# Patient Record
Sex: MALE
[De-identification: names, ages, dates, MRNs, and addresses within clinical notes are randomized per-mention and may not be internally consistent; named-entity substitution may affect disease eponyms.]

---

## 2017-08-07 NOTE — CT
PROCEDURE:  CT Abdomen and Pelvis with oral and  IV contrast.



HISTORY:

abd pain



COMPARISON:

None available



TECHNIQUE:

Contiguous axial images of the abdomen and pelvis. Oral and IV 

contrast was administered. Coronal and Sagittal reformats generated 

and reviewed. 



Contrast dose: 100 cc Visipaque 320



Radiation dose:



Total exam DLP = 771.87 mGy-cm.



This CT exam was performed using one or more of the following dose 

reduction techniques: Automated exposure control, adjustment of the 

mA and/or kV according to patient size, and/or use of iterative 

reconstruction technique.



FINDINGS:





LOWER THORAX:

No visible consolidation, pleural effusion, or pneumothorax. 



Small hiatal hernia/distal esophageal wall thickening. 



LIVER:

7 mm too small to characterize hepatic hypodensity (series 3, image 

55).Hypoattenuation of the liver compatible with hepatic steatosis. 



GALLBLADDER AND BILE DUCTS:

Cholecystectomy clips. 



PANCREAS:

Unremarkable. 



SPLEEN:

Unremarkable. 



ADRENALS:

Unremarkable. 



KIDNEYS AND URETERS:

The kidneys enhance symmetrically. No hydronephrosis or obstructing 

renal calculus. 



BLADDER:

Mildly thick-walled urinary bladder presumably exaggerated by under 

distension.



REPRODUCTIVE:

Unremarkable. 



APPENDIX:

The appendix appears within normal limits of caliber. No secondary 

signs of acute appendicitis.



BOWEL:

The stomach is nondistended. 



The bowel loops appear within normal limits of caliber without 

evidence of intestinal obstruction.Most of the left colon is 

decompressed, limiting evaluation for focal or diffuse pathology. 



PERITONEUM:

No significant free fluid. No definite free air.



LYMPH NODES:

No bulky lymphadenopathy identified.



VASCULATURE:

No aortic aneurysm. 



BONES:

Mild degenerative changes.



OTHER FINDINGS:

None. 



IMPRESSION:

Hepatic steatosis.  Too small to characterize 7 mm hepatic 

hypodensity ; statistically likely a cyst or hemangioma.



Cholecystectomy.



Mildly thick-walled urinary bladder, presumably exaggerated by under 

distension.  Recommend correlation with urinalysis.



Small hiatal hernia/ distal esophageal wall thickening.



Additional findings as above.

## 2017-08-07 NOTE — RAD
PROCEDURE:  CHEST RADIOGRAPH, 1 VIEW



HISTORY:

Abdominal pain 



COMPARISON:

None available.



FINDINGS:



LUNGS:

Mild venous congestion.  Right hilar prominence.  Patchy increased 

markings at the left lung base in a somewhat rounded configuration.  

Diffuse increased interstitial lung markings.



PLEURA:

No pneumothorax or pleural fluid seen.



CARDIOVASCULAR:

Normal.



OSSEOUS STRUCTURES:

No significant abnormalities.



VISUALIZED UPPER ABDOMEN:

Normal.



OTHER FINDINGS:

None. 



IMPRESSION:

Mild venous congestion.  Right hilar prominence.  Patchy increased 

markings at the left lung base in a somewhat rounded configuration.  

Diffuse increased interstitial lung markings.

## 2017-08-07 NOTE — C.PDOC
History Of Present Illness


41 yr old male presents to the ER with complaints of left sided abdominal pain 

for the past 2 weeks. Patient states 2 weeks ago he was seen at [facility] and was 

discharged with Pepcid but symptoms persisted. Patient also reports he has has 

diarrhea every time he eats and this morning he saw some blood. Patient denies 

fever, nausea, vomiting, dysuria, hematuria, weakness or numbness. 


Time Seen by Provider: 08/07/17 07:35


Chief Complaint (Nursing): Abdominal Pain


History Per: Patient


History/Exam Limitations: no limitations


Onset/Duration Of Symptoms: Days (2 weeks)





Past Medical History


Reviewed: Historical Data, Nursing Documentation, Vital Signs


Vital Signs: 


 Last Vital Signs











Temp  98.1 F   08/07/17 07:12


 


Pulse  75   08/07/17 10:57


 


Resp  17   08/07/17 10:57


 


BP  102/65   08/07/17 10:57


 


Pulse Ox  98   08/07/17 11:31











Surgical History: Cholecystectomy


Family History: States: No Known Family Hx





- Social History


Hx Alcohol Use: Yes


Hx Substance Use: No





Review Of Systems


Except As Marked, All Systems Reviewed And Found Negative.


Constitutional: Negative for: Fever


Gastrointestinal: Positive for: Abdominal Pain (Left sided), Diarrhea, 

Hematochezia.  Negative for: Nausea, Vomiting


Genitourinary: Negative for: Dysuria, Hematuria


Neurological: Negative for: Weakness, Numbness





Physical Exam





- Physical Exam


Appears: Non-toxic, In Acute Distress (Mild distress, holding on his left side. 

)


Skin: Warm, Dry, No Rash


Head: Atraumatic, Normacephalic


Oral Mucosa: Moist


Gastrointestinal/Abdominal: Soft, Tenderness (LLQ tenderness. ), No Guarding, 

No Rebound


Rectal: Normal Exam, No Blood Streaked Stool, No Mass, No Tenderness


Extremity: Normal ROM, No Swelling


Neurological/Psych: Oriented x3, Normal Speech, Normal Motor





ED Course And Treatment





- Laboratory Results


Result Diagrams: 


 08/07/17 07:50





 08/07/17 07:50


O2 Sat by Pulse Oximetry: 98 (RA )


Pulse Ox Interpretation: Normal





- Other Rad


  ** CXR


X-Ray: Viewed By Me, Read By Radiologist


Interpretation: PROCEDURE:  CHEST RADIOGRAPH, 1 VIEW.  HISTORY:  Abdominal 

pain.  COMPARISON:  None available.  FINDINGS:  LUNGS:  Mild venous congestion.

  Right hilar prominence.  Patchy increased markings at the left lung base in a 

somewhat rounded configuration.  Diffuse increased interstitial lung markings.  

PLEURA:  No pneumothorax or pleural fluid seen.  CARDIOVASCULAR:  Normal.  

OSSEOUS STRUCTURES:  No significant abnormalities.  VISUALIZED UPPER ABDOMEN:  

Normal.  OTHER FINDINGS:  None.  IMPRESSION:  Mild venous congestion.  Right 

hilar prominence.  Patchy increased markings at the left lung base in a 

somewhat rounded configuration.  Diffuse increased interstitial lung markings.





- CT Scan/US


  ** CT - Abd & Pelvis 


Other Rad Studies (CT/US): Read By Radiologist, Radiology Report Reviewed


CT/US Interpretation: PROCEDURE:  CT Abdomen and Pelvis with oral and  IV 

contrast.  HISTORY:  abd pain.  COMPARISON:  None available.  TECHNIQUE:  

Contiguous axial images of the abdomen and pelvis. Oral and IV contrast was 

administered. Coronal and Sagittal reformats generated and reviewed.  Contrast 

dose: 100 cc Visipaque 320.  Radiation dose:  Total exam DLP = 771.87 mGy-cm.  

This CT exam was performed using one or more of the following dose reduction 

techniques: Automated exposure control, adjustment of the mA and/or kV 

according to patient size, and/or use of iterative reconstruction technique.  

FINDINGS:  LOWER THORAX:  No visible consolidation, pleural effusion, or 

pneumothorax.  Small hiatal hernia/distal esophageal wall thickening.  LIVER:  

7 mm too small to characterize hepatic hypodensity (series 3, image 55)

.Hypoattenuation of the liver compatible with hepatic steatosis.  GALLBLADDER 

AND BILE DUCTS:  Cholecystectomy clips.  PANCREAS:  Unremarkable.  SPLEEN:  

Unremarkable.  ADRENALS:  Unremarkable.  KIDNEYS AND URETERS:  The kidneys 

enhance symmetrically. No hydronephrosis or obstructing renal calculus.  BLADDER

:  Mildly thick-walled urinary bladder presumably exaggerated by under 

distension.  REPRODUCTIVE:  Unremarkable.  APPENDIX:  The appendix appears 

within normal limits of caliber. No secondary signs of acute appendicitis.  

BOWEL:  The stomach is nondistended.  The bowel loops appear within normal 

limits of caliber without evidence of intestinal obstruction.Most of the left 

colon is decompressed, limiting evaluation for focal or diffuse pathology.  

PERITONEUM:  No significant free fluid. No definite free air.  LYMPH NODES:  No 

bulky lymphadenopathy identified.  VASCULATURE:  No aortic aneurysm.  BONES:  

Mild degenerative changes.  OTHER FINDINGS:  None.  IMPRESSION:  Hepatic 

steatosis.  Too small to characterize 7 mm hepatic hypodensity ; statistically 

likely a cyst or hemangioma.  Cholecystectomy.  Mildly thick-walled urinary 

bladder, presumably exaggerated by under distension.  Recommend correlation 

with urinalysis.  Small hiatal hernia/ distal esophageal wall thickening.  

Additional findings as above.





Medical Decision Making


Medical Decision Making: 


PLAN:


* CT - Abd & Pelvis 


* CXR


* EKG 


* CBC


* CMP


* Urinalysis 








Disposition


Counseled Patient/Family Regarding: Studies Performed, Need For Followup, Rx 

Given





- Disposition


Referrals: 


[facility] [Outside]


Disposition: HOME/ ROUTINE


Disposition Time: 11:44


Condition: STABLE


Prescriptions: 


Ciprofloxacin [Cipro] 1 tab PO BID #14 tab


Instructions:  Abdominal Pain (ED)


Forms:  [facility] (English), Work Excuse





- Clinical Impression


Clinical Impression: 


 Abdominal pain, Abdominal colic








- Scribe Statement


The provider has reviewed the documentation as recorded by the Delorisibe


Bailey Tang


Provider Attestation: 


All medical record entries made by the Delorisibdiego were at my direction and 

personally dictated by me. I have reviewed the chart and agree that the record 

accurately reflects my personal performance of the history, physical exam, 

medical decision making, and the department course for this patient. I have 

also personally directed, reviewed, and agree with the discharge instructions 

and disposition.

## 2017-08-07 NOTE — C.PDOC
Time Seen by Provider: 08/07/17 07:35


Chief Complaint (Nursing): Abdominal Pain





Past Medical History


Vital Signs: 





 Last Vital Signs











Temp  98.1 F   08/07/17 07:12


 


Pulse  90   08/07/17 07:12


 


Resp  18   08/07/17 07:12


 


BP  116/73   08/07/17 07:12


 


Pulse Ox  98   08/07/17 07:12











Surgical History: Cholecystectomy





- Social History


Hx Alcohol Use: Yes


Hx Substance Use: No





ED Course And Treatment


O2 Sat by Pulse Oximetry: 98





Disposition





- Disposition


Forms:  [facility] (English)

## 2017-08-08 NOTE — CARD
--------------- APPROVED REPORT --------------





EKG Measurement

Heart [id number]

AK [id number]

[id number]

[id number]

[id number]



<Conclusion>

Normal sinus rhythm

Normal ECG

## 2017-10-30 ENCOUNTER — HOSPITAL ENCOUNTER (EMERGENCY)
Dept: HOSPITAL 42 - ED | Age: 42
LOS: 1 days | Discharge: HOME | End: 2017-10-31
Payer: MEDICAID

## 2017-10-30 VITALS
HEART RATE: 73 BPM | RESPIRATION RATE: 17 BRPM | TEMPERATURE: 98.5 F | SYSTOLIC BLOOD PRESSURE: 135 MMHG | DIASTOLIC BLOOD PRESSURE: 108 MMHG | OXYGEN SATURATION: 99 %

## 2017-10-30 VITALS — BODY MASS INDEX: 28.5 KG/M2

## 2017-10-30 DIAGNOSIS — Z87.442: ICD-10-CM

## 2017-10-30 DIAGNOSIS — F17.210: ICD-10-CM

## 2017-10-30 DIAGNOSIS — J45.909: Primary | ICD-10-CM

## 2017-10-31 RX ADMIN — IPRATROPIUM BROMIDE AND ALBUTEROL SULFATE SCH ML: .5; 3 SOLUTION RESPIRATORY (INHALATION) at 00:38

## 2017-10-31 RX ADMIN — IPRATROPIUM BROMIDE AND ALBUTEROL SULFATE SCH ML: .5; 3 SOLUTION RESPIRATORY (INHALATION) at 00:54

## 2017-10-31 RX ADMIN — IPRATROPIUM BROMIDE AND ALBUTEROL SULFATE SCH ML: .5; 3 SOLUTION RESPIRATORY (INHALATION) at 01:05

## 2017-10-31 NOTE — ED PDOC
Arrival/HPI





- General


Chief Complaint: Cough, Cold, Congestion


Time Seen by Provider: 10/30/17 23:57


Historian: Patient





- History of Present Illness


Narrative History of Present Illness (Text): 


10/31/17 00:02


Gulshan Miller is a 42 year old male smoker, whose past medical history 

includes kidney stones and acute kidney injury, who presents to the Emergency 

department complaining of cough. Patient states he has been experiencing a 

persistent cough for the past 2 weeks with associated chest congestion/

discomfort. Patient also reports intermittent chills. Patient denies any 

shortness of breath, nausea, vomiting, diarrhea, urinary symptoms, neck pain, 

headache, dizziness, or any other complaints.


Time/Duration: < month (2 weeks)


Symptom Onset: Gradual


Symptom Course: Unchanged


Activities at Onset: Light


Context: Home





Past Medical History





- Provider Review


Nursing Documentation Reviewed: Yes





- Infectious Disease


Hx of Infectious Diseases: None





- Cardiac


Hx Cardiac Disorders: No





- Pulmonary


Hx Respiratory Disorders: No





- Neurological


Hx Neurological Disorder: No





- HEENT


Hx HEENT Disorder: No





- Renal


Hx Renal Disorder: Yes


Hx Kidney Stones: Yes


Hx Renal Failure: Yes





- Endocrine/Metabolic


Hx Endocrine Disorders: No





- Hematological/Oncological


Hx Blood Disorders: No





- Integumentary


Hx Basal Cell Carcinoma: No





- Musculoskeletal/Rheumatological


Hx Musculoskeletal Disorders: No


Hx Falls: No





- Gastrointestinal


Hx Gastrointestinal Disorders: No





- Genitourinary/Gynecological


Hx Genitourinary Disorders: Yes


Other/Comment: Kidney stones





- Psychiatric


Hx Psychophysiologic Disorder: No


Hx Substance Use: No





- Surgical History


Hx Cholecystectomy: Yes





- Anesthesia


Hx Anesthesia: Yes





Family/Social History





- Physician Review


Nursing Documentation Reviewed: Yes


Family/Social History: Unknown Family HX


Smoking Status: Current Some Days Smoker


Hx Alcohol Use: Yes


Hx Substance Use: No





Allergies/Home Meds


Allergies/Adverse Reactions: 


Allergies





seafood Allergy (Uncoded 10/30/17 23:57)


 ANAPHYLAXIS











Review of Systems





- Physician Review


All systems were reviewed & negative as marked: Yes





- Review of Systems


Constitutional: Normal.  absent: Fevers


Eyes: Normal


ENT: Normal


Respiratory: Cough


Cardiovascular: Other (+chest congestion)


Gastrointestinal: Normal.  absent: Abdominal Pain, Diarrhea, Nausea, Vomiting


Genitourinary Male: Normal


Musculoskeletal: Normal


Skin: Normal


Neurological: Normal


Endocrine: Normal


Hemo/Lymphatic: Normal


Psychiatric: Normal





Physical Exam


Vital Signs Reviewed: Yes


Vital Signs











  Temp Pulse Resp BP Pulse Ox


 


 10/30/17 23:22  98.5 F  73  17  135/108 H  99











Temperature: Afebrile


Blood Pressure: Normal


Pulse: Regular


Respiratory Rate: Normal


Appearance: Positive for: Well-Appearing, Non-Toxic, Comfortable


Pain Distress: None


Mental Status: Positive for: Alert and Oriented X 3





- Systems Exam


Head: Present: Atraumatic, Normocephalic


Pupils: Present: PERRL


Extroacular Muscles: Present: EOMI


Conjunctiva: Present: Normal


Mouth: Present: Moist Mucous Membranes


Neck: Present: Normal Range of Motion


Respiratory/Chest: Present: Wheezes.  No: Respiratory Distress, Accessory 

Muscle Use


Cardiovascular: Present: Regular Rate and Rhythm, Normal S1, S2.  No: Murmurs


Abdomen: Present: Normal Bowel Sounds.  No: Tenderness, Distention, Peritoneal 

Signs


Back: Present: Normal Inspection


Upper Extremity: Present: Normal Inspection.  No: Cyanosis, Edema


Lower Extremity: Present: Normal Inspection.  No: Edema


Neurological: Present: GCS=15, CN II-XII Intact, Speech Normal


Skin: Present: Warm, Dry, Normal Color.  No: Rashes


Psychiatric: Present: Alert, Oriented x 3, Normal Insight, Normal Concentration





Medical Decision Making


ED Course and Treatment: 


10/31/17 00:02


Impression:


42 year old male complaining of cough and chest congestion.





Differential Diagnosis included but are not limited to:  asthmatic bronchitis 





Plan:


-- Chest X-ray


-- Duoneb


-- Reassess and disposition





Prior Visits:


Notes and results from previous visits were reviewed.


On 12/23/2016, pt was seen in the Emergency department for right flank pain. Pt 

was discharged home.





Progress Notes:


10/31/17 01:18


Reviewed radiology, Chest X-ray shows no acute processes.





10/31/17 02:09


On reevaluation the patient feels better and is in no acute distress. I have 

discussed the results and plan with the patient, who expresses understanding. 

Patient given the opportunity to ask question, all questions were answered and 

there is agreement with the plan to discharge the patient home.  Patient is 

stable for discharge. Patient was instructed to follow up with physician/clinic 

in 1-2 days or return if symptoms persist/worsen or new concerning symptoms 

arise.





- RAD Interpretation


Radiology Orders: 








10/31/17 00:05


CHEST TWO VIEWS (PA/LAT) [RAD] Stat 











: ED Physician





- Medication Orders


Current Medication Orders: 











Discontinued Medications





Albuterol/Ipratropium (Duoneb 3 Mg/0.5 Mg (3 Ml) Ud)  3 ml IH Q15M LILY


   Stop: 10/31/17 00:46


   Last Admin: 10/31/17 01:05  Dose: 3 ml





Azithromycin (Zithromax)  500 mg PO ONCE STA


   PRN Reason: Protocol


   Stop: 10/31/17 02:02


   Last Admin: 10/31/17 02:21  Dose: 500 mg





Prednisone (Prednisone Tab)  60 mg PO ONCE STA


   Stop: 10/31/17 02:02


   Last Admin: 10/31/17 02:21  Dose: 60 mg





Promethazine HCl/Codeine (Phenergan/Codeine Oral Syrup)  5 ml PO ONCE STA


   Stop: 10/31/17 02:08


   Last Admin: 10/31/17 02:21  Dose: 5 ml











- Scribe Statement


The provider has reviewed the documentation as recorded by the Ml Fish





Provider Scribe Attestation:


All medical record entries made by the Massielibdaquan were at my direction and 

personally dictated by me. I have reviewed the chart and agree that the record 

accurately reflects my personal performance of the history, physical exam, 

medical decision making, and the department course for this patient. I have 

also personally directed, reviewed, and agree with the discharge instructions 

and disposition.








Disposition/Present on Arrival





- Present on Arrival


Any Indicators Present on Arrival: No


History of DVT/PE: No


History of Uncontrolled Diabetes: No


Urinary Catheter: No


History of Decub. Ulcer: No


History Surgical Site Infection Following: None





- Disposition


Have Diagnosis and Disposition been Completed?: Yes


Diagnosis: 


 Asthmatic bronchitis





Disposition: HOME/ ROUTINE


Disposition Time: 02:10


Condition: GOOD


Discharge Instructions (ExitCare):  Acute Bronchitis (ED)


Prescriptions: 


Promethazine/Codeine [Codeine/Promethazine 10 MG/5 Ml-6.25 MG/5 Ml] 5 ml PO QID 

#100 ml


predniSONE [predniSONE Tab] 20 mg PO TID #15 tab


Albuterol HFA [Ventolin HFA] 1 puff IH QID #1 puff


Azithromycin [Zithromax] 250 mg PO DAILY #4 tab


Forms:  [facility] (English)

## 2017-10-31 NOTE — RAD
HISTORY:

fall  



COMPARISON:

No prior.



TECHNIQUE:

Chest PA and lateral



FINDINGS:



LUNGS:

No active pulmonary disease.



PLEURA:

No significant pleural effusion identified. No pneumothorax apparent.



CARDIOVASCULAR:

Normal.



OSSEOUS STRUCTURES:

No significant abnormalities.



VISUALIZED UPPER ABDOMEN:

Normal.



OTHER FINDINGS:

None.



IMPRESSION:

No acute cardiopulmonary disease appreciated.

## 2018-01-22 ENCOUNTER — HOSPITAL ENCOUNTER (EMERGENCY)
Dept: HOSPITAL 42 - ED | Age: 43
Discharge: HOME | End: 2018-01-22
Payer: MEDICAID

## 2018-01-22 VITALS — RESPIRATION RATE: 17 BRPM

## 2018-01-22 VITALS — OXYGEN SATURATION: 98 %

## 2018-01-22 VITALS — TEMPERATURE: 98.1 F | HEART RATE: 82 BPM | DIASTOLIC BLOOD PRESSURE: 62 MMHG | SYSTOLIC BLOOD PRESSURE: 110 MMHG

## 2018-01-22 VITALS — BODY MASS INDEX: 28.5 KG/M2

## 2018-01-22 DIAGNOSIS — F41.9: Primary | ICD-10-CM

## 2018-01-22 DIAGNOSIS — F17.210: ICD-10-CM

## 2018-01-22 DIAGNOSIS — R07.89: ICD-10-CM

## 2018-01-22 LAB
ALBUMIN SERPL-MCNC: 4.6 G/DL (ref 3–4.8)
ALBUMIN/GLOB SERPL: 1.3 {RATIO} (ref 1.1–1.8)
ALT SERPL-CCNC: 26 U/L (ref 7–56)
AST SERPL-CCNC: 26 U/L (ref 17–59)
BASOPHILS # BLD AUTO: 0.01 K/MM3 (ref 0–2)
BASOPHILS NFR BLD: 0.1 % (ref 0–3)
BUN SERPL-MCNC: 12 MG/DL (ref 7–21)
CALCIUM SERPL-MCNC: 10.1 MG/DL (ref 8.4–10.5)
D DIMER PPP FEU-MCNC: < 200 NG/ML (ref 0–243)
EOSINOPHIL # BLD: 0.1 10*3/UL (ref 0–0.7)
EOSINOPHIL NFR BLD: 1.4 % (ref 1.5–5)
ERYTHROCYTE [DISTWIDTH] IN BLOOD BY AUTOMATED COUNT: 11.8 % (ref 11.5–14.5)
GFR NON-AFRICAN AMERICAN: > 60
GRANULOCYTES # BLD: 4.46 10*3/UL (ref 1.4–6.5)
GRANULOCYTES NFR BLD: 54.9 % (ref 50–68)
HGB BLD-MCNC: 14.6 G/DL (ref 14–18)
INR PPP: 1.03 (ref 0.93–1.08)
LYMPHOCYTES # BLD: 2.8 10*3/UL (ref 1.2–3.4)
LYMPHOCYTES NFR BLD AUTO: 34.2 % (ref 22–35)
MCH RBC QN AUTO: 30.6 PG (ref 25–35)
MCHC RBC AUTO-ENTMCNC: 34.5 G/DL (ref 31–37)
MCV RBC AUTO: 88.7 FL (ref 80–105)
MONOCYTES # BLD AUTO: 0.8 10*3/UL (ref 0.1–0.6)
MONOCYTES NFR BLD: 9.4 % (ref 1–6)
PLATELET # BLD: 257 10^3/UL (ref 120–450)
PMV BLD AUTO: 8.9 FL (ref 7–11)
PROTHROMBIN TIME: 11.7 SECONDS (ref 9.4–12.5)
RBC # BLD AUTO: 4.77 10^6/UL (ref 3.5–6.1)
TROPONIN I SERPL-MCNC: < 0.01 NG/ML
WBC # BLD AUTO: 8.1 10^3/UL (ref 4.5–11)

## 2018-01-22 NOTE — ED PDOC
Arrival/HPI





- General


Chief Complaint: Chest Pain


Time Seen by Provider: 01/22/18 20:02


Historian: Patient





- History of Present Illness


Narrative History of Present Illness (Text): 





01/22/18 20:05





Gulshan Miller is a 42 year old male who presents to the emergency department 

complaining of chest pain with associated left arm numbness for a few days. 

Patient states that for the last few days he would wake up with shortness of 

breath and shakes. On the way from work today, patient began to feel the same 

symptoms with additional lightheadedness, prompting him to go to the emergency 

department. Patient notes that he has a family history cardiac problems. 

Patient has no other complaints at this time. Patient endorses that he is a 

former smoker and works in construction.





Time/Duration: < month


Symptom Onset: Gradual


Symptom Course: Unchanged, Intermittent


Activities at Onset: Light


Context: Home





Past Medical History





- Provider Review


Nursing Documentation Reviewed: Yes





- Infectious Disease


Hx of Infectious Diseases: None





- Cardiac


Hx Cardiac Disorders: No





- Pulmonary


Hx Respiratory Disorders: No





- Neurological


Hx Neurological Disorder: No





- HEENT


Hx HEENT Disorder: No





- Renal


Hx Renal Disorder: Yes


Hx Kidney Stones: Yes


Hx Renal Failure: Yes





- Endocrine/Metabolic


Hx Endocrine Disorders: No





- Hematological/Oncological


Hx Blood Disorders: No





- Integumentary


Hx Basal Cell Carcinoma: No





- Musculoskeletal/Rheumatological


Hx Musculoskeletal Disorders: No


Hx Falls: No





- Gastrointestinal


Hx Gastrointestinal Disorders: No





- Genitourinary/Gynecological


Hx Genitourinary Disorders: Yes


Other/Comment: Kidney stones





- Psychiatric


Hx Psychophysiologic Disorder: No


Hx Substance Use: No





- Surgical History


Hx Cholecystectomy: Yes





- Anesthesia


Hx Anesthesia: Yes





Family/Social History





- Physician Review


Nursing Documentation Reviewed: Yes


Family/Social History: CAD/MI


Smoking Status: Light Smoker < 10 Cigarettes Daily


Hx Alcohol Use: Yes


Frequency of alcohol use: Socially


Hx Substance Use: No





Allergies/Home Meds


Allergies/Adverse Reactions: 


Allergies





shrimp Allergy (Verified 01/22/18 19:26)


 RASH


seafood Allergy (Uncoded 10/30/17 23:57)


 ANAPHYLAXIS








Home Medications: 


 Home Meds











 Medication  Instructions  Recorded  Confirmed


 


No Known Home Med  01/22/18 01/22/18














Review of Systems





- Physician Review


All systems were reviewed & negative as marked: Yes





- Review of Systems


Constitutional: absent: Fevers, Night Sweats


Eyes: absent: Vision Changes


ENT: absent: Hearing Changes


Respiratory: SOB


Cardiovascular: Chest Pain


Gastrointestinal: absent: Abdominal Pain


Genitourinary Male: absent: Dysuria, Frequency


Musculoskeletal: absent: Arthralgias


Skin: absent: Rash, Pruritis


Neurological: absent: Headache, Dizziness


Endocrine: absent: Diaphoresis


Hemo/Lymphatic: absent: Adenopathy


Psychiatric: absent: Anxiety, Depression





Physical Exam


Vital Signs Reviewed: Yes


Vital Signs











  Temp Pulse Resp BP Pulse Ox


 


 01/22/18 20:24   70  17  119/86  98


 


 01/22/18 19:23  98.7 F  70  18  100/64  98











Temperature: Afebrile


Blood Pressure: Normal


Pulse: Regular


Respiratory Rate: Normal


Appearance: Positive for: Well-Appearing, Non-Toxic, Comfortable


Pain Distress: None


Mental Status: Positive for: Alert and Oriented X 3





- Systems Exam


Head: Present: Atraumatic, Normocephalic


Pupils: Present: PERRL


Extroacular Muscles: Present: EOMI


Conjunctiva: Present: Normal


Mouth: Present: Moist Mucous Membranes


Neck: Present: Normal Range of Motion


Respiratory/Chest: Present: Clear to Auscultation, Good Air Exchange.  No: 

Respiratory Distress, Accessory Muscle Use


Cardiovascular: Present: Regular Rate and Rhythm, Normal S1, S2.  No: Murmurs


Abdomen: Present: Normal Bowel Sounds.  No: Tenderness, Distention, Peritoneal 

Signs


Back: Present: Normal Inspection


Upper Extremity: Present: Normal Inspection.  No: Cyanosis, Edema


Lower Extremity: Present: Normal Inspection.  No: Edema


Neurological: Present: GCS=15, CN II-XII Intact, Speech Normal


Skin: Present: Warm, Dry, Normal Color.  No: Rashes


Psychiatric: Present: Alert, Oriented x 3, Normal Insight, Normal Concentration





Medical Decision Making


ED Course and Treatment: 





01/22/18 20:19


Impression:





42 year old male who presents to the emergency department complaining of 

intermittent chest pain with associated left arm numbness for a few days.





Differential Diagnosis included but are not limited to:  





Plan:


-- Reassess and disposition





Prior Visits:


Notes and results from previous visits were reviewed. Patient was last seen in 

the emergency department on 10/30/17 for cough. Patient was discharged home. 





Progress Notes:





EKG:


Ordered, reviewed, and independently interpreted the EKG.


Rate : 72  BPM


Rhythm : NSR


Interpretation : Normal axis, normal intervals.





01/22/18 22:55


Chest xray:


No active disease, as read by me.





All labs normal, will discharge patient home. 








- Lab Interpretations


Lab Results: 








 01/22/18 21:23 





 01/22/18 21:23 





 Lab Results





01/22/18 21:23: Sodium 142, Potassium 3.8, Chloride 106, Carbon Dioxide 23, 

Anion Gap 16, BUN 12, Creatinine 1.0, Est GFR (African Amer) > 60, Est GFR (Non-

Af Amer) > 60, Random Glucose 101, Calcium 10.1, Total Bilirubin 0.7, AST 26, 

ALT 26, Alkaline Phosphatase 70, Lactate Dehydrogenase 492, Total Creatine 

Kinase 175, Troponin I < 0.01, Total Protein 8.0, Albumin 4.6, Globulin 3.4, 

Albumin/Globulin Ratio 1.3


01/22/18 21:23: PT 11.7, INR 1.03, D-Dimer, Quantitative < 200


01/22/18 21:23: WBC 8.1, RBC 4.77, Hgb 14.6, Hct 42.3, MCV 88.7, MCH 30.6, MCHC 

34.5, RDW 11.8, Plt Count 257, MPV 8.9, Gran % 54.9, Lymph % (Auto) 34.2, Mono 

% (Auto) 9.4 H, Eos % (Auto) 1.4 L, Baso % (Auto) 0.1, Gran # 4.46, Lymph # 2.8

, Mono # 0.8 H, Eos # 0.1, Baso # 0.01











- RAD Interpretation


Radiology Orders: 








01/22/18 21:10


CHEST TWO VIEWS (PA/LAT) [RAD] Stat 














- Medication Orders


Current Medication Orders: 











Discontinued Medications





Ibuprofen (Motrin Tab)  800 mg PO STAT STA


   Stop: 01/22/18 21:24


   Last Admin: 01/22/18 21:30  Dose: 800 mg





Lorazepam (Ativan)  2 mg PO ONCE ONE


   PRN Reason: Protocol


   Stop: 01/22/18 21:11


   Last Admin: 01/22/18 21:30  Dose: 2 mg











- Scribe Statement


The provider has reviewed the documentation as recorded by the Ml So





Provider Scribe Attestation:


All medical record entries made by the Massielibdaquan were at my direction and 

personally dictated by me. I have reviewed the chart and agree that the record 

accurately reflects my personal performance of the history, physical exam, 

medical decision making, and the department course for this patient. I have 

also personally directed, reviewed, and agree with the discharge instructions 

and disposition.





Disposition/Present on Arrival





- Present on Arrival


Any Indicators Present on Arrival: No


History of DVT/PE: No


History of Uncontrolled Diabetes: No


Urinary Catheter: No


History of Decub. Ulcer: No


History Surgical Site Infection Following: None





- Disposition


Have Diagnosis and Disposition been Completed?: Yes


Diagnosis: 


 Chest pain, atypical, Anxiety





Disposition: HOME/ ROUTINE


Disposition Time: 22:44


Patient Plan: Discharge


Patient Problems: 


 Current Active Problems











Problem Status Onset


 


Chest pain, atypical Acute  


 


Anxiety Acute  











Condition: GOOD


Discharge Instructions (ExitCare):  Chest Pain (ED)


Additional Instructions: 


Gulshan,





All of your tests were normal, but because of your family history I want you to 

follow up with a cardiologist as soon as possible.  Dr Goyal [phone] 

is on call.  Call the office in the morning to make an appointment.





Return to us if any problems.





Best-


Dr. James Wilson





Talk to your regular doctor about having a test for sleep apnea as soon as you 

can.


Referrals: 


Veronica Oliver, DO [Primary Care Provider] - Follow up with primary


Forms:  [facility] (English)

## 2018-01-23 NOTE — RAD
HISTORY:

CHEST PAIN  



COMPARISON:

10/31/2017



TECHNIQUE:

Chest PA and lateral



FINDINGS:



LUNGS:

No active pulmonary disease.



PLEURA:

No significant pleural effusion identified. No pneumothorax apparent.



CARDIOVASCULAR:

Normal.



OSSEOUS STRUCTURES:

No significant abnormalities.



VISUALIZED UPPER ABDOMEN:

Normal.



OTHER FINDINGS:

None.



IMPRESSION:

No active disease.

## 2018-01-23 NOTE — CARD
--------------- APPROVED REPORT --------------





EKG Measurement

Heart [id number]

[id number]

[id number]

[id number]

[id number]



<Conclusion>

Normal sinus rhythm with sinus arrhythmia

Cannot rule out Anterior infarct, age undetermined

Abnormal ECG

## 2018-03-08 ENCOUNTER — HOSPITAL ENCOUNTER (EMERGENCY)
Dept: HOSPITAL 42 - ED | Age: 43
Discharge: HOME | End: 2018-03-08
Payer: MEDICAID

## 2018-03-08 VITALS
SYSTOLIC BLOOD PRESSURE: 117 MMHG | DIASTOLIC BLOOD PRESSURE: 66 MMHG | HEART RATE: 65 BPM | OXYGEN SATURATION: 100 % | RESPIRATION RATE: 18 BRPM

## 2018-03-08 VITALS — BODY MASS INDEX: 29.8 KG/M2

## 2018-03-08 VITALS — TEMPERATURE: 98.1 F

## 2018-03-08 DIAGNOSIS — J06.9: Primary | ICD-10-CM

## 2018-03-08 DIAGNOSIS — F17.210: ICD-10-CM

## 2018-03-08 NOTE — ED PDOC
Arrival/HPI





- General


Time Seen by Provider: 03/08/18 08:06


Historian: Patient





- History of Present Illness


Narrative History of Present Illness (Text): 


03/08/18 08:07


Gulshan Miller is a 42 year old male smoker, with so significant past medical 

history, presents to the emergency department complaining of a cough for 3 

days. After going to work yesterday, the patient began experiencing a yellow 

phlegm, headache, and a subjective fever. Patient admits to smoking 6-7 

cigarettes a day and marijuana to sleep. Patient denies any chest pain, 

dizziness, back pain, neck pain, abdominal pain or any other complaints. 





Time/Duration: < week (3 days)


Symptom Onset: Gradual


Symptom Course: Worsening


Activities at Onset: Light


Context: Home





Past Medical History





- Provider Review


Nursing Documentation Reviewed: Yes





- Infectious Disease


Hx of Infectious Diseases: None





- Cardiac


Hx Cardiac Disorders: No





- Pulmonary


Hx Respiratory Disorders: No





- Neurological


Hx Neurological Disorder: No





- HEENT


Hx HEENT Disorder: No





- Renal


Hx Renal Disorder: Yes


Hx Kidney Stones: Yes


Hx Renal Failure: Yes





- Endocrine/Metabolic


Hx Endocrine Disorders: No





- Hematological/Oncological


Hx Blood Disorders: No





- Integumentary


Hx Basal Cell Carcinoma: No





- Musculoskeletal/Rheumatological


Hx Musculoskeletal Disorders: No


Hx Falls: No





- Gastrointestinal


Hx Gastrointestinal Disorders: No





- Genitourinary/Gynecological


Hx Genitourinary Disorders: Yes


Other/Comment: Kidney stones





- Psychiatric


Hx Psychophysiologic Disorder: No


Hx Substance Use: No





- Surgical History


Hx Cholecystectomy: Yes





- Anesthesia


Hx Anesthesia: Yes





Family/Social History





- Physician Review


Nursing Documentation Reviewed: Yes


Family/Social History: Unknown Family HX


Smoking Status: Light Smoker < 10 Cigarettes Daily


Hx Alcohol Use: Yes


Hx Substance Use: No





Allergies/Home Meds


Allergies/Adverse Reactions: 


Allergies





shrimp Allergy (Verified 01/22/18 19:26)


 RASH


seafood Allergy (Uncoded 10/30/17 23:57)


 ANAPHYLAXIS











Review of Systems





- Physician Review


All systems were reviewed & negative as marked: Yes





- Review of Systems


Constitutional: Fevers


Eyes: Normal


ENT: Normal


Respiratory: Cough.  absent: SOB


Cardiovascular: Normal.  absent: Chest Pain, Palpitations


Gastrointestinal: Normal.  absent: Abdominal Pain, Constipation, Diarrhea, 

Nausea, Vomiting


Genitourinary Male: Normal.  absent: Dysuria, Frequency, Hematuria, Urinary 

Output Changes


Musculoskeletal: Normal.  absent: Back Pain, Neck Pain


Skin: Normal.  absent: Rash


Neurological: Headache.  absent: Dizziness


Endocrine: Normal


Hemo/Lymphatic: Normal


Psychiatric: Normal





Physical Exam


Vital Signs Reviewed: Yes


Vital Signs











  Temp Pulse Resp BP Pulse Ox


 


 03/08/18 09:33   65  18  117/66  100


 


 03/08/18 07:52  98.1 F  69  17  134/87  98











Temperature: Afebrile


Blood Pressure: Normal


Pulse: Regular


Respiratory Rate: Normal


Appearance: Positive for: Well-Appearing, Non-Toxic, Comfortable


Pain Distress: None


Mental Status: Positive for: Alert and Oriented X 3





- Systems Exam


Head: Present: Atraumatic, Normocephalic


Pupils: Present: PERRL


Extroacular Muscles: Present: EOMI


Conjunctiva: Present: Normal


Mouth: Present: Moist Mucous Membranes


Neck: Present: Normal Range of Motion


Respiratory/Chest: Present: Clear to Auscultation, Good Air Exchange.  No: 

Respiratory Distress, Accessory Muscle Use


Cardiovascular: Present: Regular Rate and Rhythm, Normal S1, S2.  No: Murmurs


Abdomen: Present: Normal Bowel Sounds.  No: Tenderness, Distention, Peritoneal 

Signs


Back: Present: Normal Inspection


Upper Extremity: Present: Normal Inspection.  No: Cyanosis, Edema


Lower Extremity: Present: Normal Inspection.  No: Edema


Neurological: Present: GCS=15, CN II-XII Intact, Speech Normal


Skin: Present: Warm, Dry, Normal Color.  No: Rashes


Psychiatric: Present: Alert, Oriented x 3, Normal Insight, Normal Concentration





Medical Decision Making


ED Course and Treatment: 


03/08/18 08:13


Impression:


42 year old male presents to the ED complaining of a worsening cough for 3 days.





Plan:


-- CXR PA/LAT


-- Reassess and disposition





Prior Visits:


Notes and results from previous visits were reviewed.


Patient presented to the ED on 06/15/2016 for abdominal pain. Patient was 

discharged home.





Progress Notes:


03/08/18 09:39


CXR reviewed, shows: 


LINES AND TUBES:


None. 





LUNG AND PLEURA:


The lungs are well inflated. There is left basilar atelectasis. No focal 

consolidation 





HEART AND MEDIASTINUM:


The heart is not enlarged. The hilar and mediastinal contours are within normal 

limits.





SKELETAL STRUCTURES:


The bony structures are within normal limits for the patient's age.





VISUALIZED UPPER ABDOMEN:


Normal.





OTHER FINDINGS:


None.








- RAD Interpretation


Radiology Orders: 








03/08/18 08:06


CHEST TWO VIEWS (PA/LAT) [RAD] Stat 














- Scribe Statement


The provider has reviewed the documentation as recorded by the Scribe


Marilyn Cunningham





All medical record entries made by the Scribe were at my direction and 

personally dictated by me. I have reviewed the chart and agree that the record 

accurately reflects my personal performance of the history, physical exam, 

medical decision making, and the department course for this patient. I have 

also personally directed, reviewed, and agree with the discharge instructions 

and disposition.








Disposition/Present on Arrival





- Present on Arrival


Any Indicators Present on Arrival: No


History of DVT/PE: No


History of Uncontrolled Diabetes: No


Urinary Catheter: No


History Surgical Site Infection Following: None





- Disposition


Have Diagnosis and Disposition been Completed?: Yes


Diagnosis: 


 Upper respiratory infection





Disposition: HOME/ ROUTINE


Disposition Time: 09:20


Condition: GOOD


Discharge Instructions (ExitCare):  Cough in Adults


Additional Instructions: 





Thank you for letting us take care of you today. The emergency medical care you 

received today was directed at your acute symptoms. If you were prescribed any 

medication, please fill it and take as directed. It may take several days for 

your symptoms to resolve. Return to the Emergency Department if your symptoms 

worsen, do not improve, or if you have any other problems.





Please contact your doctor or call one of the physicians/clinics you have been 

referred to that are listed on the Patient Visit Information form that is 

included in your discharge packet. Bring any paperwork you were given at 

discharge with you along with any medications you are taking to your follow up 

visit. Our treatment cannot replace ongoing medical care by a primary care 

provider (PCP) outside of the emergency department.





Thank you for allowing the [facility] team to be part of your care today.

















Follow up with your doctor in 2-3 days for re-evaluation and further management.


Prescriptions: 


Azithromycin [Zithromax] 250 mg PO DAILY #6 tab


Referrals: 


Veronica Oliver DO [[facility]] - Follow up with primary


Forms:  [employer] (English)

## 2018-03-08 NOTE — RAD
HISTORY:



COMPARISON:

01/22/2018.



TECHNIQUE:

Chest PA and lateral



FINDINGS:



LINES AND TUBES:

None. 



LUNG AND PLEURA:

The lungs are well inflated. There is left basilar atelectasis. No 

focal consolidation 



HEART AND MEDIASTINUM:

The heart is not enlarged. The hilar and mediastinal contours are 

within normal limits.



SKELETAL STRUCTURES:

The bony structures are within normal limits for the patient's age.



VISUALIZED UPPER ABDOMEN:

Normal.



OTHER FINDINGS:

None.



IMPRESSION:

No active pulmonary disease.

## 2018-08-30 ENCOUNTER — HOSPITAL ENCOUNTER (EMERGENCY)
Dept: HOSPITAL 31 - C.ER | Age: 43
Discharge: HOME | End: 2018-08-30
Payer: MEDICAID

## 2018-08-30 VITALS — DIASTOLIC BLOOD PRESSURE: 61 MMHG | HEART RATE: 73 BPM | SYSTOLIC BLOOD PRESSURE: 131 MMHG | TEMPERATURE: 98.3 F

## 2018-08-30 VITALS — RESPIRATION RATE: 18 BRPM

## 2018-08-30 VITALS — BODY MASS INDEX: 29.8 KG/M2

## 2018-08-30 DIAGNOSIS — N18.9: ICD-10-CM

## 2018-08-30 DIAGNOSIS — B34.9: Primary | ICD-10-CM

## 2018-08-30 LAB
ALBUMIN SERPL-MCNC: 4.3 [, G/DL] (ref 3.5–5)
ALBUMIN/GLOB SERPL: 1.5 [,] (ref 1–2.1)
ALT SERPL-CCNC: 48 [, U/L] (ref 21–72)
AST SERPL-CCNC: 30 [, U/L] (ref 17–59)
BASOPHILS # BLD AUTO: 0 [, K/UL] (ref 0–0.2)
BASOPHILS NFR BLD: 0.3 [, %] (ref 0–2)
BILIRUB UR-MCNC: NEGATIVE [,]
BUN SERPL-MCNC: 18 [, MG/DL] (ref 9–20)
CALCIUM SERPL-MCNC: 9.3 [, MG/DL] (ref 8.6–10.4)
EOSINOPHIL # BLD AUTO: 0.1 [, K/UL] (ref 0–0.7)
EOSINOPHIL NFR BLD: 0.9 [, %] (ref 0–4)
ERYTHROCYTE [DISTWIDTH] IN BLOOD BY AUTOMATED COUNT: 12.5 [, %] (ref 11.5–14.5)
GFR NON-AFRICAN AMERICAN: > 60 [,]
GLUCOSE UR STRIP-MCNC: NORMAL [, MG/DL]
HGB BLD-MCNC: 14.6 [, G/DL] (ref 12–18)
LEUKOCYTE ESTERASE UR-ACNC: (no result) [, LEU/UL]
LIPASE: 59 [, U/L] (ref 23–300)
LYMPHOCYTES # BLD AUTO: 2.1 [, K/UL] (ref 1–4.3)
LYMPHOCYTES NFR BLD AUTO: 22.3 [, %] (ref 20–40)
MCH RBC QN AUTO: 31.3 [, PG] (ref 27–31)
MCHC RBC AUTO-ENTMCNC: 34.4 [, G/DL] (ref 33–37)
MCV RBC AUTO: 90.9 [, FL] (ref 80–94)
MONOCYTES # BLD: 0.6 [, K/UL] (ref 0–0.8)
MONOCYTES NFR BLD: 6.5 [, %] (ref 0–10)
NEUTROPHILS # BLD: 6.5 [, K/UL] (ref 1.8–7)
NEUTROPHILS NFR BLD AUTO: 70 [, %] (ref 50–75)
NRBC BLD AUTO-RTO: 0.1 [, %] (ref 0–2)
PH UR STRIP: 5 [,] (ref 5–8)
PLATELET # BLD: 275 [, K/UL] (ref 130–400)
PMV BLD AUTO: 6.8 [, FL] (ref 7.2–11.7)
PROT UR STRIP-MCNC: NEGATIVE [, MG/DL]
RBC # BLD AUTO: 4.65 [, MIL/UL] (ref 4.4–5.9)
RBC # UR STRIP: NEGATIVE [,]
SP GR UR STRIP: 1.03 [,] (ref 1–1.03)
SQUAMOUS EPITHIAL: < 1 [, /HPF] (ref 0–5)
UROBILINOGEN UR-MCNC: NORMAL [, MG/DL] (ref 0.2–1)
WBC # BLD AUTO: 9.3 [, K/UL] (ref 4.8–10.8)

## 2018-08-30 PROCEDURE — 87086 URINE CULTURE/COLONY COUNT: CPT

## 2018-08-30 PROCEDURE — 96374 THER/PROPH/DIAG INJ IV PUSH: CPT

## 2018-08-30 PROCEDURE — 85025 COMPLETE CBC W/AUTO DIFF WBC: CPT

## 2018-08-30 PROCEDURE — 81001 URINALYSIS AUTO W/SCOPE: CPT

## 2018-08-30 PROCEDURE — 83690 ASSAY OF LIPASE: CPT

## 2018-08-30 PROCEDURE — 96361 HYDRATE IV INFUSION ADD-ON: CPT

## 2018-08-30 PROCEDURE — 99284 EMERGENCY DEPT VISIT MOD MDM: CPT

## 2018-08-30 PROCEDURE — 80053 COMPREHEN METABOLIC PANEL: CPT

## 2018-08-30 NOTE — C.PDOC
History Of Present Illness


43 y/o male with history of Acute Renal Failure presents to ED with c/o Nausea, 

vomiting, diarrhea and back pain for 3 days. Patient denies recent travel, fever

, chills, rash, dysuria, hematuria, bowel/bladder incontinence or any other 

complaints at this time.   


Time Seen by Provider: 08/30/18 10:35


Chief Complaint (Nursing): GI Problem


History Per: Patient


History/Exam Limitations: no limitations


Onset/Duration Of Symptoms: Days


Current Symptoms Are (Timing): Still Present





Past Medical History


Reviewed: Historical Data, Nursing Documentation, Vital Signs


Vital Signs: 


 Last Vital Signs











Temp  98.3 F   08/30/18 13:12


 


Pulse  73   08/30/18 13:12


 


Resp  18   08/30/18 13:12


 


BP  131/61   08/30/18 13:12


 


Pulse Ox  98   08/30/18 13:12














- Medical History


PMH: Anxiety, Kidney Stones, Chronic Kidney Disease


Surgical History: Cholecystectomy





- [facility] Procedures








INJECT/INFUSE NEC (10/04/07)


INTRODUCTION OF SERUM/TOX/VACCINE INTO MUSCLE, PERC APPROACH (06/16/16)








Family History: States: No Known Family Hx





- Social History


Hx Alcohol Use: No


Hx Substance Use: No





Review Of Systems


Constitutional: Negative for: Fever, Chills


Gastrointestinal: Positive for: Vomiting, Abdominal Pain, Diarrhea


Genitourinary: Negative for: Dysuria, Frequency, Incontinence


Musculoskeletal: Positive for: Back Pain


Skin: Negative for: Rash





Physical Exam





- Physical Exam


Appears: Non-toxic, No Acute Distress


Skin: Warm, Dry, No Rash


Head: Atraumatic, Normacephalic


Eye(s): bilateral: Normal Inspection


Oral Mucosa: Moist


Neck: Normal ROM, Supple


Chest: Symmetrical, No Tenderness


Cardiovascular: Rhythm Regular, No Friction Rub, No Murmur


Respiratory: Normal Breath Sounds, No Rales, No Rhonchi, No Wheezing


Gastrointestinal/Abdominal: Soft, No Tenderness, No Guarding, No Rebound


Back: No CVA Tenderness, No Paraspinal Tenderness


Neurological/Psych: Oriented x3, Normal Speech, Normal Cognition, Normal Motor


Gait: Steady





ED Course And Treatment





- Laboratory Results


Result Diagrams: 


 08/30/18 11:34





 08/30/18 11:34


O2 Sat by Pulse Oximetry: 96 (RA)


Pulse Ox Interpretation: Normal





Medical Decision Making


Medical Decision Making: 


On re-exam, the patient reports improvement of symptoms. Lungs are CTA, heart 

is RRR, abdomen is soft, non-tender and tolerating PO well. Ambulatory in the 

ED with steady gait. Follow up with the medical doctor within 1-2 days. Return 

if worsened. 





Maxx Fox





All medical record entries made by the Scribe were at my direction and 

personally dictated by me. I have reviewed the chart and agree that the record 

accurately reflects my personal performance of the history, physical exam, 

medical decision making, and the department course for this patient. I have 

also personally directed, reviewed, and agree with the discharge instructions 

and disposition.





Disposition





- Disposition


Referrals: 


[facility] [Outside]


Disposition: HOME/ ROUTINE


Disposition Time: 13:04


Condition: GOOD


Additional Instructions: 


Follow up with the medical doctor within 1-2 days. Return if worsened. 


Instructions:  Viral Syndrome (DC)


Forms:  [employer] (English), Work Excuse





- POA


Present On Arrival: None





- Clinical Impression


Clinical Impression: 


 Viral syndrome








- PA / NP / Resident Statement


MD/DO has reviewed & agrees with the documentation as recorded.





- Scribe Statement


The provider has reviewed the documentation as recorded by the Scribe


Maxx Fox





All medical record entries made by the Scribe were at my direction and 

personally dictated by me. I have reviewed the chart and agree that the record 

accurately reflects my personal performance of the history, physical exam, 

medical decision making, and the department course for this patient. I have 

also personally directed, reviewed, and agree with the discharge instructions 

and disposition.

## 2018-08-31 VITALS — OXYGEN SATURATION: 96 %
